# Patient Record
Sex: MALE | Race: WHITE | Employment: FULL TIME | URBAN - METROPOLITAN AREA
[De-identification: names, ages, dates, MRNs, and addresses within clinical notes are randomized per-mention and may not be internally consistent; named-entity substitution may affect disease eponyms.]

---

## 2017-07-05 ENCOUNTER — TELEPHONE (OUTPATIENT)
Dept: NEUROLOGY | Age: 57
End: 2017-07-05

## 2017-07-05 NOTE — TELEPHONE ENCOUNTER
----- Message from Wu Croft sent at 7/5/2017 12:44 PM EDT -----  Regarding: Dr. Collins Setting Telephone  Pt would like a callback regarding his ADHD, and is having a hard time focusing. Pascual(C)[phone].

## 2017-07-06 NOTE — TELEPHONE ENCOUNTER
Patient relays symptoms of ADHD. Instructed to make an appointment with Dr Jona Cardozo. Phone number given.   lesli

## 2017-09-16 RX ORDER — LEVETIRACETAM 250 MG/1
TABLET ORAL
Qty: 60 TAB | Refills: 0 | Status: SHIPPED | OUTPATIENT
Start: 2017-09-16 | End: 2017-11-20 | Stop reason: SDUPTHER

## 2017-11-20 RX ORDER — LEVETIRACETAM 250 MG/1
TABLET ORAL
Qty: 60 TAB | Refills: 0 | Status: SHIPPED | OUTPATIENT
Start: 2017-11-20

## 2024-03-12 ENCOUNTER — OFFICE VISIT (OUTPATIENT)
Age: 64
End: 2024-03-12
Payer: COMMERCIAL

## 2024-03-12 ENCOUNTER — TELEPHONE (OUTPATIENT)
Age: 64
End: 2024-03-12

## 2024-03-12 VITALS
OXYGEN SATURATION: 99 % | HEART RATE: 90 BPM | TEMPERATURE: 97.9 F | HEIGHT: 68 IN | BODY MASS INDEX: 28.04 KG/M2 | RESPIRATION RATE: 16 BRPM | SYSTOLIC BLOOD PRESSURE: 134 MMHG | WEIGHT: 185 LBS | DIASTOLIC BLOOD PRESSURE: 74 MMHG

## 2024-03-12 DIAGNOSIS — R55 SYNCOPE, UNSPECIFIED SYNCOPE TYPE: Primary | ICD-10-CM

## 2024-03-12 PROCEDURE — 99205 OFFICE O/P NEW HI 60 MIN: CPT | Performed by: NURSE PRACTITIONER

## 2024-03-12 RX ORDER — GABAPENTIN 300 MG/1
300 CAPSULE ORAL 3 TIMES DAILY
COMMUNITY
Start: 2024-02-01

## 2024-03-12 RX ORDER — ACETAMINOPHEN AND CODEINE PHOSPHATE 300; 30 MG/1; MG/1
TABLET ORAL
COMMUNITY
Start: 2024-02-01 | End: 2024-03-12 | Stop reason: ALTCHOICE

## 2024-03-12 RX ORDER — LISINOPRIL 20 MG/1
TABLET ORAL
COMMUNITY
Start: 2023-12-23

## 2024-03-12 RX ORDER — LORAZEPAM 1 MG/1
1 TABLET ORAL 2 TIMES DAILY PRN
COMMUNITY
Start: 2024-02-14

## 2024-03-12 RX ORDER — ATORVASTATIN CALCIUM 40 MG/1
40 TABLET, FILM COATED ORAL DAILY
COMMUNITY
Start: 2024-02-13

## 2024-03-12 RX ORDER — METOPROLOL SUCCINATE 50 MG/1
50 TABLET, EXTENDED RELEASE ORAL DAILY
COMMUNITY
Start: 2024-01-11

## 2024-03-12 RX ORDER — APIXABAN 5 MG/1
TABLET, FILM COATED ORAL
COMMUNITY
Start: 2024-03-10

## 2024-03-12 NOTE — PROGRESS NOTES
Omkar Mary is a 63 y.o. male who presents with the following  Chief Complaint   Patient presents with    New Patient     Patient states in Nov 2023 he passed out in the shower and was taken to the ER, he sent 24 hours in the ER, they stated he did not have a TIA, seizure, or stroke. He C/O right eye pressure, he has seen opthalmology and a optometrist and they state his eyes are fine. Former patient of DR Maria.       HPI    New patient comes here to be evaluated for possible TIA, stroke, seizure?  He states over Thanksgiving November 2023 he passed out in the shower and was taken to [facility]  He spent about 24 hours in the ER and they said he did not have a TIA stroke or seizure they were not sure what it was  He does complain of some right eye pressure and a little bit of drooping and has been seen by ophthalmology and optometry and they both said his eyes are normal  He did use to see Dr. Lam for migraines and he has an occasional migraine here and there but medical cannabis has helped    So all he really remembers is that he was in the shower and he fell out  His son states he could not move or do anything but he called 911  His eyes were open and he was talking gibberish like he was having a stroke  He had slurred speech  His son tried to wake him up and when he could not fully wake him up he called EMS and he went to [facility] and was run the gamut of tests  He still does have some pressure and drooping of the right eyelid  This is never happened before  He did see his cardiologist and is doing well  He is also on Eliquis, lisinopril, and metoprolol  He does not take Lipitor and Ativan as needed    Nothing has changed since that hospital visit  This is never happened before  It is not happened again  He is just here to see if we can find some answers  No Known Allergies    Current Outpatient Medications   Medication Sig Dispense Refill    ELIQUIS 5 MG TABS tablet

## 2024-03-12 NOTE — TELEPHONE ENCOUNTER
No pa required for ans testing [id number]  Patient has medicare    Hold prior to testing   Lisinopril-3 days  Gabapentin-3 days  Metoprolol-1 day

## 2024-04-09 ENCOUNTER — TELEPHONE (OUTPATIENT)
Age: 64
End: 2024-04-09

## 2024-04-09 NOTE — TELEPHONE ENCOUNTER
Patient would like a call regarding his ANS test he has on 04/11/24. He forgot what medications he is suppose to stop taking.

## 2024-04-11 ENCOUNTER — PROCEDURE VISIT (OUTPATIENT)
Age: 64
End: 2024-04-11

## 2024-04-11 DIAGNOSIS — R55 SYNCOPE, UNSPECIFIED SYNCOPE TYPE: Primary | ICD-10-CM

## 2024-04-12 NOTE — PROGRESS NOTES
[facility]  [address], [address]  [place] [address]    Patient ID:  Omkar Mary  [id number]  63 y.o.  1960     REFERRED BY: Ravinder Alanis NP  PCP: Manuel Teague MD    Date of Testin2024       Indication/History:    Episodes of pressure behind the R eye. Possible episode of fainting spell    Patient is coming for syncope/autonomic dysfunction evaluation.    Medications taken 48 hrs before the test: Eliquis     Procedure: This Autonomic Nervous System (ANS) testing is performed by utilizing [facility] Work Autonomic System, with established protocol.  Multiple procedures performed: Heart rate response to deep breathing (HRDB), Valsalva ratio, Heart rate and BP response to head up tilt (HUT), and Quantitative sudomotor axon reflex testing (QSWEAT) .     Result:  Heart response to deep  breathing (HRDB): 2 series of 6 cycles were performed and the mean of 6 consecutive cycles was obtained. Average HR difference was 9.09 and E:I ratio was 1.15. Normal response.  Heart rate response to Valsalva maneuver:  Fkbo-bf-mbdu BP to Valsalva was measured and BP response in all 4 phases was normal. Heart response was the opposite of BP, a normal response. ( VR = 1.8 )  HUT (head-up tilt) : Htxd-bn-spjp BP and HR were measured, up to 15 minutes post tilt.  No significant BP reduction or HR acceleration/deceleration.  SUDOMOTOR: QSWEAT response showed relatively preserved sweat production in all 4 localities (forearm, proximal leg, distal leg, foot) of the right upper and lower limbs, comparing patient to age group.     Impression:   NORMAL    Relatively preserved autonomic function for this age.         Toyn Lawrence MD  Diplomate, American Board of Psychiatry and Neurology  Diplomate, Neuromuscular Medicine  Diplomate, American Board of Electrodiagnostic Medicine    Note: Raw Data will be scanned separately in Media

## 2024-08-26 ENCOUNTER — OFFICE VISIT (OUTPATIENT)
Age: 64
End: 2024-08-26
Payer: MEDICARE

## 2024-08-26 VITALS
DIASTOLIC BLOOD PRESSURE: 70 MMHG | TEMPERATURE: 97.8 F | OXYGEN SATURATION: 99 % | SYSTOLIC BLOOD PRESSURE: 122 MMHG | RESPIRATION RATE: 16 BRPM | HEART RATE: 78 BPM

## 2024-08-26 DIAGNOSIS — G43.909 EPISODIC MIGRAINE: ICD-10-CM

## 2024-08-26 DIAGNOSIS — R55 SYNCOPE, UNSPECIFIED SYNCOPE TYPE: Primary | ICD-10-CM

## 2024-08-26 DIAGNOSIS — H54.7 VISUAL LOSS: ICD-10-CM

## 2024-08-26 PROCEDURE — G8427 DOCREV CUR MEDS BY ELIG CLIN: HCPCS | Performed by: NURSE PRACTITIONER

## 2024-08-26 PROCEDURE — 1036F TOBACCO NON-USER: CPT | Performed by: NURSE PRACTITIONER

## 2024-08-26 PROCEDURE — 99214 OFFICE O/P EST MOD 30 MIN: CPT | Performed by: NURSE PRACTITIONER

## 2024-08-26 PROCEDURE — G8419 CALC BMI OUT NRM PARAM NOF/U: HCPCS | Performed by: NURSE PRACTITIONER

## 2024-08-26 PROCEDURE — 3017F COLORECTAL CA SCREEN DOC REV: CPT | Performed by: NURSE PRACTITIONER

## 2024-08-27 PROCEDURE — 96372 THER/PROPH/DIAG INJ SC/IM: CPT | Performed by: NURSE PRACTITIONER

## 2024-08-27 RX ORDER — PROMETHAZINE HYDROCHLORIDE 25 MG/ML
25 INJECTION, SOLUTION INTRAMUSCULAR; INTRAVENOUS ONCE
Status: COMPLETED | OUTPATIENT
Start: 2024-08-27 | End: 2024-08-27

## 2024-08-27 RX ORDER — KETOROLAC TROMETHAMINE 30 MG/ML
60 INJECTION, SOLUTION INTRAMUSCULAR; INTRAVENOUS ONCE
Status: COMPLETED | OUTPATIENT
Start: 2024-08-27 | End: 2024-08-27

## 2024-08-27 RX ADMIN — KETOROLAC TROMETHAMINE 60 MG: 30 INJECTION, SOLUTION INTRAMUSCULAR; INTRAVENOUS at 09:41

## 2024-08-27 RX ADMIN — PROMETHAZINE HYDROCHLORIDE 25 MG: 25 INJECTION, SOLUTION INTRAMUSCULAR; INTRAVENOUS at 09:41

## 2024-08-27 NOTE — PROGRESS NOTES
Omkar Mary is a 63 y.o. male who presents with the following  Chief Complaint   Patient presents with    Results     Patient is here today for ANS results.     Migraine     Patient states he woke up with a migraine.        HPI      FU ANS   Continues to feel bad   Today has a bad migraine   Not sure why or where it came from.   Took his cannabis, but did not help  He has the lights dimmed today   He continues to feel off.     No Known Allergies    Current Outpatient Medications   Medication Sig Dispense Refill    ELIQUIS 5 MG TABS tablet       atorvastatin (LIPITOR) 40 MG tablet Take 1 tablet by mouth daily      lisinopril (PRINIVIL;ZESTRIL) 20 MG tablet       gabapentin (NEURONTIN) 300 MG capsule Take 1 capsule by mouth 3 times daily.      LORazepam (ATIVAN) 1 MG tablet Take 1 tablet by mouth 2 times daily as needed.      metoprolol succinate (TOPROL XL) 50 MG extended release tablet Take 1 tablet by mouth daily      medical marijuana Take by mouth as needed.       No current facility-administered medications for this visit.        Social History     Tobacco Use   Smoking Status Never    Passive exposure: Never   Smokeless Tobacco Never       Past Medical History:   Diagnosis Date    A-fib (HCC)        No past surgical history on file.    No family history on file.    Social History     Socioeconomic History    Marital status:      Spouse name: None    Number of children: None    Years of education: None    Highest education level: None   Tobacco Use    Smoking status: Never     Passive exposure: Never    Smokeless tobacco: Never   Substance and Sexual Activity    Alcohol use: Not Currently    Drug use: Yes     Types: Marijuana (Weed)     Comment: medical marijuana     Social Determinants of Health     Financial Resource Strain: Medium Risk (4/15/2022)    [facility], [facility]    Financial Resource Strain     Financial Concerns: 2  Examination: Normal tone, bulk, and strength, 5/5 muscle strength throughout.         Total time: 35 min   Counseling / coordination time: 30 min   > 50% counseling / coordination?: Yes re: treatment        MRI Result (most recent):  MRI SHOULDER LEFT WO CONTRAST     Narrative  [facility]          Name: HIWOT VIDALES JR  [address]      Phys: [place] [address]             : 1960   Age: 62       Sex: M  Acct: [id number]  Loc: [facility]  Phone #: [phone]        Exam Date: 2023 Status: DEP CLI  FAX #: [phone]        Rad No:             URN: [id number]  Unit No: [id number]        EXAMS:  [id number] MR SHOULDER LT WO IV CON      Reason for Visit: INCOMPLETE ROTATR-CUFF TEAR/RUPTR OF L S  Reason for Exam: PARTIAL NONTRAUMATIC TEAR OF ROTAOR  EXAM: MRI Left Shoulder without Contrast    HISTORY:PARTIAL NONTRAUMATIC TEAR OF ROTAOR    COMPARISON: Left shoulder radiographs 2023    TECHNIQUE: Multiplanar multisequence MR images of the left shoulder  were obtained without contrast.    FINDINGS:    Somewhat suboptimal assessment related to nonstandard imaging planes  provided. Below findings are within exam limitations.    Acromioclavicular joint and subacromial space: Moderate  acromioclavicular joint degenerative change. No significant fluid in  the subacromial subdeltoid bursa. Concave undersurface of the  acromion.    Rotator cuff: Supraspinatus and infraspinatus tendinosis without  significant tear. Subscapularis tendinosis without significant tear.  The teres minor tendon appears intact. No significant rotator cuff  muscle atrophy or edema.    Long head of biceps tendon: Intra-articular biceps tendinosis.    Labrum: Circumferential labral degeneration and fraying/complex  tearing, most pronounced superiorly and inferiorly.    Cartilage: Glenohumeral articular cartilage irregularity. There is  partial thickness cartilage loss that

## 2024-09-24 ENCOUNTER — PROCEDURE VISIT (OUTPATIENT)
Age: 64
End: 2024-09-24

## 2024-09-24 DIAGNOSIS — H54.7 VISUAL LOSS: Primary | ICD-10-CM

## 2024-09-24 NOTE — PROGRESS NOTES
EMG/ NCS Report  [facility] [address], [address]  [place]  [address]   Ph: [phone]   FAX: [phone]/ [phone]  Test Date:  2019      Test Date:  2024    Patient: HIWOT VIDALES : 1960 Physician: JENI AUGUST MD   Sex: Male Height: ' \" Ref Phys: OSIEL ONEIL   ID#: [id number] Weight:  lbs. Technician: Quyen Landry     Patient History / Exam:  CC: VISUAL CHANGES.          EMG & NCV Findings:    Impression:        ___________________________  JENI AUGUST MD      VEP 1ch     Trace NR N75 (ms) P100 (ms) N145 (ms) N95-O194 (µV)   Norm   <117     Ch1 : O1-Cz : L    69.9 100.8 119.92 3.05   Ch1 : O2-Cz : R    70.3 91.0 129.69 4.02   L-R Norm   <7     L-R  0.4 9.8 9.80 0.97                   Waveforms:

## 2024-10-02 ENCOUNTER — TELEPHONE (OUTPATIENT)
Age: 64
End: 2024-10-02

## 2024-10-02 NOTE — TELEPHONE ENCOUNTER
Is requesting to have referral and notes resent to them as they did not receive it.    [phone]

## 2024-10-18 NOTE — PROGRESS NOTES
Procedures    [facility]   Visual Evoked Potential Report    Patient: Omkar Mary  1960  Date of Service: 9/24/2024      Bilateral full field pattern reversal visual evoked potential is performed.  Waveforms are appropriate for interpretation.  Absolute latency of the P100 is normal bilaterally.    Normal Study      iLana Garzon MD

## 2025-03-27 ENCOUNTER — OFFICE VISIT (OUTPATIENT)
Age: 65
End: 2025-03-27
Payer: MEDICARE

## 2025-03-27 VITALS
DIASTOLIC BLOOD PRESSURE: 82 MMHG | RESPIRATION RATE: 18 BRPM | HEART RATE: 84 BPM | OXYGEN SATURATION: 99 % | TEMPERATURE: 97.9 F | SYSTOLIC BLOOD PRESSURE: 130 MMHG

## 2025-03-27 DIAGNOSIS — H54.7 VISUAL LOSS: Primary | ICD-10-CM

## 2025-03-27 PROCEDURE — 99214 OFFICE O/P EST MOD 30 MIN: CPT | Performed by: NURSE PRACTITIONER

## 2025-03-27 PROCEDURE — 1123F ACP DISCUSS/DSCN MKR DOCD: CPT | Performed by: NURSE PRACTITIONER

## 2025-03-31 NOTE — PROGRESS NOTES
Result (most recent):  MRI SHOULDER LEFT WO CONTRAST     Narrative  [facility]          Name: HIWOT VIDALES JR  [address]      Phys: Ubaldo Rodriguez MD  [place] [address]             : 1960   Age: 62       Sex: M  Acct: [id number]  Loc: [facility]  Phone #: [phone]        Exam Date: 2023 Status: DEP CLI  FAX #: [phone]        Rad No:             URN: [id number]  Unit No: [id number]        EXAMS:  [id number] MR SHOULDER LT WO IV CON      Reason for Visit: INCOMPLETE ROTATR-CUFF TEAR/RUPTR OF L S  Reason for Exam: PARTIAL NONTRAUMATIC TEAR OF ROTAOR  EXAM: MRI Left Shoulder without Contrast    HISTORY:PARTIAL NONTRAUMATIC TEAR OF ROTAOR    COMPARISON: Left shoulder radiographs 2023    TECHNIQUE: Multiplanar multisequence MR images of the left shoulder  were obtained without contrast.    FINDINGS:    Somewhat suboptimal assessment related to nonstandard imaging planes  provided. Below findings are within exam limitations.    Acromioclavicular joint and subacromial space: Moderate  acromioclavicular joint degenerative change. No significant fluid in  the subacromial subdeltoid bursa. Concave undersurface of the  acromion.    Rotator cuff: Supraspinatus and infraspinatus tendinosis without  significant tear. Subscapularis tendinosis without significant tear.  The teres minor tendon appears intact. No significant rotator cuff  muscle atrophy or edema.    Long head of biceps tendon: Intra-articular biceps tendinosis.    Labrum: Circumferential labral degeneration and fraying/complex  tearing, most pronounced superiorly and inferiorly.    Cartilage: Glenohumeral articular cartilage irregularity. There is  partial thickness cartilage loss that appears low-grade along the  superior aspect humeral head.    Bones: No fractures or aggressive osseous lesions. No suspicious  marrow signal abnormalities are evident.    Miscellaneous: No glenohumeral joint

## 2025-05-12 ENCOUNTER — TELEPHONE (OUTPATIENT)
Age: 65
End: 2025-05-12

## 2025-05-12 NOTE — TELEPHONE ENCOUNTER
HIPAA Verified (if caller is someone other than patient):   [] Yes  [] No  [x] N/A     Reason for Call: Document the reason for call:     Patient stated he had a different kind of headache this weekend behind his right eye.     KEKE Arechiga referred him to [facility] and he wants to know the provider name.              Level 1 Calls - attempted to reach practice?  [] Yes   [] No  [x] N/A    Reason Call Marked High Priority (if applicable):